# Patient Record
(demographics unavailable — no encounter records)

---

## 2024-11-25 NOTE — HEALTH RISK ASSESSMENT
[Yes] : Yes [Monthly or less (1 pt)] : Monthly or less (1 point) [1 or 2 (0 pts)] : 1 or 2 (0 points) [Never (0 pts)] : Never (0 points) [0] : 2) Feeling down, depressed, or hopeless: Not at all (0) [PHQ-2 Negative - No further assessment needed] : PHQ-2 Negative - No further assessment needed [Never] : Never [NO] : No

## 2024-11-25 NOTE — ASSESSMENT
[FreeTextEntry1] : #Low Energy #Irregular Periods #Tearfulness #Unable to Lose Weight Ms. Mills describes a combination of symptoms which are likely inter-related, including: low energy despite adequate sleep, trouble losing weight despite healthy eating and regular exercise, new-onset tearfulness despite reporting a good mood, and irregular periods. She is concerned about hormonal changes as well as thyroid disorder. She does not believe that she could be pregnant but has not taken a test. Her symptoms could potentially be explained by pregnancy vs thyroid disorder vs early perimenopause, or alternatively could be normal aging with new mood disorder. Discussed initiating a preliminary workup today with blood testing.  #HM UTD on flu, covid, pap, does not need STI screening today. Likely due for tdap.

## 2024-11-25 NOTE — PLAN
[FreeTextEntry1] : #Low Energy #Tearfulness #Unable to Lose Weight #Irregular Periods - R/o pregnancy - Check labs today - Close follow-up  #HM - Labs today - Can make appt for tdap if she is due

## 2024-11-25 NOTE — HISTORY OF PRESENT ILLNESS
[de-identified] : Ms. WOODALL is a 38 year y/o F with PMH of GERD who presents as a new patient today to establish care. CC mood changes/tearfulness, trouble losing weight, feeling excessively tired  #Repro Period irregular the last few months which is new Did IVF: a doctor during IVF asked about PCOS Does not think she could be pregnant although has not taken a test  #Mood Has had anxiety for a long time Increased tearfulness, the only other time she has felt like this is when she did IVF Tearful during our interview  #Low Energy Change in restfulness despite same amount of sleep Using apple watch to track sleep Feeling hot and sweaty especially at night No changes in appetite Probiotic helping with constipation No N/V, no skin changes.   PMH: GERD on nexium since age 18, constipation (taking probiotic seed with good results) PSH: 2007 vocal cord surgery for pseudocyst Allergies: seasonal Medications: seed probiotic, vitamin D supplement, nexium, melatonin, magnesium before bed, allergy drops since July from [facility] Fam Hx: does not know family hx (adopted) Social History: Lives with  and cat Echo Works as briceno/songwriter/ Tobacco use: never Alcohol use: every now and again, every couple months Drug use: none Sexually active: Y doesn't need STI testing today Diet & Exercise: got a walking pad, doesn't feel like high impact is working Mood: tearful for the last month Firearms: N  #Health Maintenance Flu shot: UTD Covid vaccine: UTD Tdap: likely due Pap: never abn, probably UTD STI screen: not today Family Planning: none, stopped for egg freezing and never restarted

## 2024-12-13 NOTE — ASSESSMENT
[FreeTextEntry1] : #Pre-DM We discussed lifestyle changes, rechecking A1C in 3 months, as well as meeting with a diabetes educator.  #Anemia Feeling much better since starting iron supplement.

## 2024-12-13 NOTE — PHYSICAL EXAM
[No Acute Distress] : no acute distress [Well-Appearing] : well-appearing [Normal Sclera/Conjunctiva] : normal sclera/conjunctiva [EOMI] : extraocular movements intact [Normal Outer Ear/Nose] : the outer ears and nose were normal in appearance [No Respiratory Distress] : no respiratory distress  [No Accessory Muscle Use] : no accessory muscle use [No Joint Swelling] : no joint swelling [Grossly Normal Strength/Tone] : grossly normal strength/tone [No Rash] : no rash [Coordination Grossly Intact] : coordination grossly intact [Normal Gait] : normal gait [Normal Affect] : the affect was normal [Normal Insight/Judgement] : insight and judgment were intact

## 2024-12-13 NOTE — HISTORY OF PRESENT ILLNESS
[de-identified] : Ms. WOODALL  is a 38 year y/o F who presents for follow-up today for recently diagnosed pre-diabetes and DANIELA.  #Pre-DM Has been working on making dietary changes with some success We discussed DM educator, pt is interested  Reviewed lab results and discussed parameters for pre-DM and DM, as well as timeline of checking A1C  #Anemia Taking iron supplement feeling better Notes increased energy, better mood

## 2024-12-13 NOTE — PLAN
[FreeTextEntry1] : #Pre-DM - CDE referral - Continue working on healthy eating and exercise - Recheck A1C in 3 months  #Anemia - Continue iron supplement - Can recheck CBC and iron studies at 3m f/u

## 2025-03-07 NOTE — HISTORY OF PRESENT ILLNESS
[de-identified] : THERESA WOODALL is a 38 year y/o F with GERD, DANIELA, pre-DM who presents for follow-up today.   #Pre-DM Last A1C: 5.9 (11/23/2024) Has been implementing lifestyle modifications  #DANIELA Has been supplementing daily and notes increased energy  #HLD Notes weight loss and improved energy  #Elevated Vitamin D Stopped taking supplement

## 2025-03-07 NOTE — ASSESSMENT
[FreeTextEntry1] : #DANIELA Has been taking daily iron supplement, feels more energy, okay with checking levels  #Pre-DM Pt has lost weight, changed eating habits, feeling well Would like to recheck A1C today  #HLD Losing weight, eating better, okay with rechecking lipids today  #Elevated Vitamin D Has stopped supplement, would like to check that level is coming down

## 2025-03-07 NOTE — PHYSICAL EXAM
[No Acute Distress] : no acute distress [Well-Appearing] : well-appearing [Normal Sclera/Conjunctiva] : normal sclera/conjunctiva [EOMI] : extraocular movements intact [Normal Outer Ear/Nose] : the outer ears and nose were normal in appearance [Coordination Grossly Intact] : coordination grossly intact [Normal Gait] : normal gait [Normal] : affect was normal and insight and judgment were intact

## 2025-03-07 NOTE — PLAN
[FreeTextEntry1] : #DANIELA - Check CBC and iron studies - Continue daily iron supplement while results pending  #Pre-DM - Check A1C today - Continue healthy eating and exercise  #HLD - Check lipids - Continue healthy eating and exercise  #Elevated Vitamin D - Check vit D level